# Patient Record
Sex: MALE | Race: WHITE | NOT HISPANIC OR LATINO | Employment: STUDENT | URBAN - METROPOLITAN AREA
[De-identification: names, ages, dates, MRNs, and addresses within clinical notes are randomized per-mention and may not be internally consistent; named-entity substitution may affect disease eponyms.]

---
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
## 2024-08-09 ENCOUNTER — NURSE TRIAGE (OUTPATIENT)
Dept: PEDIATRICS | Facility: CLINIC | Age: 19
End: 2024-08-09
Payer: COMMERCIAL

## 2024-08-09 ENCOUNTER — OFFICE VISIT (OUTPATIENT)
Dept: URGENT CARE | Facility: URGENT CARE | Age: 19
End: 2024-08-09
Payer: COMMERCIAL

## 2024-08-09 VITALS
DIASTOLIC BLOOD PRESSURE: 72 MMHG | WEIGHT: 201.9 LBS | SYSTOLIC BLOOD PRESSURE: 113 MMHG | TEMPERATURE: 97.5 F | OXYGEN SATURATION: 99 % | HEART RATE: 60 BPM | BODY MASS INDEX: 28.56 KG/M2

## 2024-08-09 DIAGNOSIS — D69.6 THROMBOCYTOPENIA (H): ICD-10-CM

## 2024-08-09 DIAGNOSIS — D72.819 LEUKOPENIA, UNSPECIFIED TYPE: ICD-10-CM

## 2024-08-09 DIAGNOSIS — R21 RASH AND NONSPECIFIC SKIN ERUPTION: Primary | ICD-10-CM

## 2024-08-09 LAB
ANION GAP SERPL CALCULATED.3IONS-SCNC: 3 MMOL/L (ref 3–14)
BUN SERPL-MCNC: 10 MG/DL (ref 7–30)
CALCIUM SERPL-MCNC: 9.3 MG/DL (ref 8.5–10.1)
CHLORIDE BLD-SCNC: 105 MMOL/L (ref 94–109)
CO2 SERPL-SCNC: 33 MMOL/L (ref 20–32)
CREAT SERPL-MCNC: 1.2 MG/DL (ref 0.66–1.25)
DEPRECATED S PYO AG THROAT QL EIA: NEGATIVE
EGFRCR SERPLBLD CKD-EPI 2021: 89 ML/MIN/1.73M2
ERYTHROCYTE [SEDIMENTATION RATE] IN BLOOD BY WESTERGREN METHOD: 7 MM/HR (ref 0–15)
FLUAV AG SPEC QL IA: NEGATIVE
FLUBV AG SPEC QL IA: NEGATIVE
GLUCOSE BLD-MCNC: 97 MG/DL (ref 70–99)
MONOCYTES NFR BLD AUTO: NEGATIVE %
POTASSIUM BLD-SCNC: 4.5 MMOL/L (ref 3.4–5.3)
SODIUM SERPL-SCNC: 141 MMOL/L (ref 135–145)

## 2024-08-09 PROCEDURE — 85027 COMPLETE CBC AUTOMATED: CPT | Performed by: PHYSICIAN ASSISTANT

## 2024-08-09 PROCEDURE — 86140 C-REACTIVE PROTEIN: CPT | Performed by: PHYSICIAN ASSISTANT

## 2024-08-09 PROCEDURE — 99214 OFFICE O/P EST MOD 30 MIN: CPT | Performed by: PHYSICIAN ASSISTANT

## 2024-08-09 PROCEDURE — 86308 HETEROPHILE ANTIBODY SCREEN: CPT | Performed by: PHYSICIAN ASSISTANT

## 2024-08-09 PROCEDURE — 87804 INFLUENZA ASSAY W/OPTIC: CPT | Performed by: PHYSICIAN ASSISTANT

## 2024-08-09 PROCEDURE — 80048 BASIC METABOLIC PNL TOTAL CA: CPT | Performed by: PHYSICIAN ASSISTANT

## 2024-08-09 PROCEDURE — 87651 STREP A DNA AMP PROBE: CPT | Performed by: PHYSICIAN ASSISTANT

## 2024-08-09 PROCEDURE — 36415 COLL VENOUS BLD VENIPUNCTURE: CPT | Performed by: PHYSICIAN ASSISTANT

## 2024-08-09 PROCEDURE — 85652 RBC SED RATE AUTOMATED: CPT | Performed by: PHYSICIAN ASSISTANT

## 2024-08-09 NOTE — PROGRESS NOTES
Assessment & Plan     1. Rash and nonspecific skin eruption  - CBC with platelets; Future  - Basic metabolic panel  (Ca, Cl, CO2, Creat, Gluc, K, Na, BUN); Future  - ESR: Erythrocyte sedimentation rate; Future  - CRP, inflammation; Future  - Streptococcus A Rapid Screen w/Reflex to PCR - Clinic Collect  - Influenza A/B antigen  - Mononucleosis screen; Future  - CBC with platelets  - Basic metabolic panel  (Ca, Cl, CO2, Creat, Gluc, K, Na, BUN)  - ESR: Erythrocyte sedimentation rate  - CRP, inflammation  - Mononucleosis screen  - Group A Streptococcus PCR Throat Swab    2. Thrombocytopenia    3. Leukopenia, unspecified type      Patient presents to the clinic for evaluation of rash for the past 2 days. Initially, he had a fever, but that has since resolved. Rash is maculopapular. Non tender and not weeping.   She does not he does not have laboratory evidence of inflammation as his sed rate is normal.  Strep test, influenza test and monotest are all negative.  Strep test and influenza test is negative. I suspect that this is a viral exanthem, as he also has mild thrombocytopenia, and leukopenia.  His CBC is currently under slide review. Very low suspicion for sepsis. Rash is not fungal appearing, does not appear to be hives. He does not have angioedema or severe systemic reaction. At this time, I advised him to monitor his symptoms. Trial of zyrtec for the rash. If worsening, or if fever returns, severe symptoms, chest pain, SOB, weakness, neck pain, headache, weakness etc to follow-up urgently. I would like him to have his CBC redrawn in one week to ensure thrombocytopenia and leukopenia resolved.    Return in about 2 days (around 8/11/2024), or if symptoms worsen or fail to improve.    Diagnosis and treatment plan was reviewed with patient and/or family.   We went over any labs or imaging. Discussed worsening symptoms or little to no relief despite treatment plan to follow-up with PCP or return to clinic.  Patient  verbalizes understanding. All questions were addressed and answered.     Cecilia Queen PA-C  [facility]    CHIEF COMPLAINT:   Chief Complaint   Patient presents with    Urgent Care     Has been having a fever for the past 4 days, started Sunday night/Monday morning. Developed rash on forehead, face, chest, and upper back. No pain with the rash. Fever is gone as of this morning.      Kavya Lala is a 19 year old male who presents to clinic today for evaluation of rash. Started yesterday and located on his face, forehead, chest and back.   Initially, he had a fever and dry cough for 4 days, that resolved this AM.    Rash is not itchy or painful. No weeping or drainage from the wound.   He was sweating in his sleep when he had the fever and is curious if the rash could be from that.   No history of rash like this in the past.   No new products or detergents. No new foods or medications.   Slight non productive cough. NO chest pain or shortness of breath.   He has not had tongue / lip swelling, difficulty breathing.       Neg COVID test at home.       Past Medical History:   Diagnosis Date    Closed nondisplaced fracture of triquetrum of left wrist 12/1/2015     No past surgical history on file.  Social History     Tobacco Use    Smoking status: Never     Passive exposure: Never    Smokeless tobacco: Never    Tobacco comments:     nonsmoking home   Substance Use Topics    Alcohol use: Never     Alcohol/week: 0.0 standard drinks of alcohol     Current Outpatient Medications   Medication Sig Dispense Refill    acetaminophen (TYLENOL) 500 MG tablet Take 500-1,000 mg by mouth every 6 hours as needed      guanFACINE (INTUNIV) 2 MG TB24 24 hr tablet Take 1 tablet (2 mg) by mouth At Bedtime 90 tablet 3    guanFACINE (INTUNIV) 2 MG TB24 24 hr tablet Take 1 tablet (2 mg) by mouth At Bedtime 90 tablet 0     No current facility-administered medications for this visit.     Allergies   Allergen  Reactions    Lactose Intolerance (Gi) Diarrhea    Seasonal Allergies        10 point ROS of systems were all negative except for pertinent positives noted in my HPI.      Exam:   /72 (BP Location: Right arm)   Pulse 60   Temp 97.5  F (36.4  C) (Tympanic)   Wt 91.6 kg (201 lb 14.4 oz)   SpO2 99%   BMI 28.56 kg/m    Constitutional: healthy, alert and no distress  Head: Normocephalic, atraumatic.  Eyes: conjunctiva clear, no drainage  ENT: TMs clear and shiny alfred, nasal mucosa pink and moist, throat without tonsillar hypertrophy or erythema  Neck: neck is supple, no cervical lymphadenopathy or nuchal rigidity  Cardiovascular: RRR  Respiratory: CTA bilaterally, no rhonchi or rales  Gastrointestinal: soft and nontender  Skin: Erythematous maculopapular type rash on the forehead, back and chest. No vesicles, crusitng or weeping.   Neurologic: Speech clear, gait normal. Moves all extremities.                Results for orders placed or performed in visit on 08/09/24   Basic metabolic panel  (Ca, Cl, CO2, Creat, Gluc, K, Na, BUN)     Status: Abnormal   Result Value Ref Range    Sodium 141 135 - 145 mmol/L    Potassium 4.5 3.4 - 5.3 mmol/L    Chloride 105 94 - 109 mmol/L    Carbon Dioxide (CO2) 33 (H) 20 - 32 mmol/L    Anion Gap 3 3 - 14 mmol/L    Urea Nitrogen 10 7 - 30 mg/dL    Creatinine 1.20 0.66 - 1.25 mg/dL    GFR Estimate 89 >60 mL/min/1.73m2    Calcium 9.3 8.5 - 10.1 mg/dL    Glucose 97 70 - 99 mg/dL   ESR: Erythrocyte sedimentation rate     Status: Normal   Result Value Ref Range    Erythrocyte Sedimentation Rate 7 0 - 15 mm/hr   Mononucleosis screen     Status: Normal   Result Value Ref Range    Mononucleosis Screen Negative Negative   Streptococcus A Rapid Screen w/Reflex to PCR - Clinic Collect     Status: Normal    Specimen: Throat; Swab   Result Value Ref Range    Group A Strep antigen Negative Negative   Influenza A/B antigen     Status: Normal    Specimen: Nose; Swab   Result Value Ref Range     Influenza A antigen Negative Negative    Influenza B antigen Negative Negative    Narrative    Test results must be correlated with clinical data. If necessary, results should be confirmed by a molecular assay or viral culture.     CBC  WBC 2.25  HGB 14

## 2024-08-09 NOTE — TELEPHONE ENCOUNTER
Pt called worried about rash that started on 8/8 AM. States rash is red, splotchy, and rough and is located on forehead, face, chest and back. Pt also states that he has not been feeling well lately with a fever that started on 8/5 (103 F) until 8/9 1am (current temp is 98 F) and has a lingering cough. Has been waking up the past two nights covered in sweat. States that the rash on his back and chest did not appear until he slept shirtless on 8/8. He believes the rash might be from night sweats and the fever breaking.     Denies itching, SOB, swelling, hives, and starting any new medications in the past 2 weeks. Took 2 at home COVID tests that came back negative.      Per protocol, advised pt to be seen in  today since no appts in office available. Patient was given an opportunity to ask questions and at this time denies further questions. Patient verbalizes understanding, and is agreeable to the discussed plan. Advised patient if there are any further questions, to reach out for assistance. Patient agrees to follow-up as needed.     Carmelita Robert RN on 8/9/2024 at 3:36 PM    08/09/2024 at 3:36 PM     Reason for Disposition   Mild widespread rash  (Exception: Heat rash lasting 3 days or less.)    Additional Information   Negative: Sudden onset of rash (within last 2 hours) and difficulty with breathing or swallowing   Negative: Difficult to awaken or acting confused (e.g., disoriented, slurred speech)   Negative: Fever and purple or blood-colored spots or dots   Negative: Too weak or sick to stand   Negative: Life-threatening reaction (anaphylaxis) in the past to similar substance (e.g., food, insect bite/sting, chemical, etc.) and < 2 hours since exposure   Negative: Sounds like a life-threatening emergency to the triager   Negative: Drug rash suspected and started taking new medicine within last 2 weeks  (Exception: Antihistamine, eye drops, ear drops, decongestant or other OTC cough/cold medicines.)    "Negative: Hives suspected   Negative: Insect bites suspected   Negative: MPOX SUSPECTED (e.g., direct skin contact such as sex, recent travel to [place] or [place]) and any SYMPTOMS OF MPOX (e.g., rash, fever, muscle aches, or swollen lymph nodes)   Negative: At risk for Mpox (men-who-have-sex-with-men) and possible exposure (e.g., multiple sex partners in past 21 days) and ANY SYMPTOMS OF MPOX (e.g., rash, fever, muscle aches, or swollen lymph nodes)   Negative: Sunburn suspected   Negative: Bright red, sunburn-like rash and current tampon use or nasal packing   Negative: Bright red, sunburn-like rash and wound infection or recent surgery   Negative: Bright red skin that peels off in sheets   Negative: Stiff neck (can't touch chin to chest)   Negative: Patient sounds very sick or weak to the triager   Negative: Fever   Negative: Face becomes swollen   Negative: Headache   Negative: Purple or blood-colored spots or dots (no fever and sounds well to triager)   Negative: Joint pain or swelling   Negative: Bloody crusts on lips or sores in mouth   Negative: Large or small blisters on skin (i.e., fluid filled bubbles or sacs)   Negative: Pregnant   Negative: Rash began within 4 hours of a new prescription medication   Negative: SEVERE itching   Negative: Sore throat   Negative: Ring-like appearance of rash (or ask: does it look like a 'target' or 'bulls-eye')   Negative: Patient wants to be seen    Answer Assessment - Initial Assessment Questions  1. APPEARANCE of RASH: \"Describe the rash.\" (e.g., spots, blisters, raised areas, skin peeling, scaly)      Spots, patches, rough   2. SIZE: \"How big are the spots?\" (e.g., tip of pen, eraser, coin; inches, centimeters)      Splotchy   3. LOCATION: \"Where is the rash located?\"      Forehead, face, back,   4. COLOR: \"What color is the rash?\" (Note: It is difficult to assess rash color in people with darker-colored skin. When this situation occurs, simply ask the caller to " "describe what they see.)      red  5. ONSET: \"When did the rash begin?\"      8/8  6. FEVER: \"Do you have a fever?\" If Yes, ask: \"What is your temperature, how was it measured, and when did it start?\"      Not currently. Last time had fever 8/9 1am  7. ITCHING: \"Does the rash itch?\" If Yes, ask: \"How bad is the itch?\" (Scale 1-10; or mild, moderate, severe)      No  8. CAUSE: \"What do you think is causing the rash?\"      Sweating in bed, fever breaking   9. MEDICINE FACTORS: \"Have you started any new medicines within the last 2 weeks?\" (e.g., antibiotics)       No  10. OTHER SYMPTOMS: \"Do you have any other symptoms?\" (e.g., dizziness, headache, sore throat, joint pain)        Lingering cough    Protocols used: Rash or Redness - Widespread-A-OH    "

## 2024-08-09 NOTE — PATIENT INSTRUCTIONS
I suspect that you have a viral illness  Your labs came back with a low WBC and platelet count, this can happen with viral infections as well. The lab is being rechecked for slide review.   I would like you to have your CBC rechecked in one week  In regards to the rash, take 10mg of zyrtec daily  Monitor  Avoid sunlight, heat     If worsening, please follow-up right away, or if fever returns, you have drainage from the rash, weakness, severe headache or neck pain etc

## 2024-08-10 LAB
CRP SERPL-MCNC: 3.51 MG/L
ERYTHROCYTE [DISTWIDTH] IN BLOOD BY AUTOMATED COUNT: 11.7 % (ref 10–15)
GROUP A STREP BY PCR: NOT DETECTED
HCT VFR BLD AUTO: 41.3 % (ref 40–53)
HGB BLD-MCNC: 14.1 G/DL (ref 13.3–17.7)
MCH RBC QN AUTO: 29.6 PG (ref 26.5–33)
MCHC RBC AUTO-ENTMCNC: 34.1 G/DL (ref 31.5–36.5)
MCV RBC AUTO: 87 FL (ref 78–100)
PLATELET # BLD AUTO: 111 10E3/UL (ref 150–450)
RBC # BLD AUTO: 4.77 10E6/UL (ref 4.4–5.9)
WBC # BLD AUTO: 2.2 10E3/UL (ref 4–11)

## 2024-09-01 ENCOUNTER — HEALTH MAINTENANCE LETTER (OUTPATIENT)
Age: 19
End: 2024-09-01

## 2024-10-13 DIAGNOSIS — F98.8 ATTENTION DEFICIT DISORDER (ADD) WITHOUT HYPERACTIVITY: ICD-10-CM

## 2024-10-13 RX ORDER — GUANFACINE 2 MG/1
2 TABLET, EXTENDED RELEASE ORAL AT BEDTIME
Qty: 90 TABLET | Refills: 1 | OUTPATIENT
Start: 2024-10-13

## 2024-10-13 RX ORDER — GUANFACINE 2 MG/1
2 TABLET, EXTENDED RELEASE ORAL AT BEDTIME
Qty: 90 TABLET | Refills: 0 | Status: SHIPPED | OUTPATIENT
Start: 2024-10-13

## 2025-01-25 DIAGNOSIS — F98.8 ATTENTION DEFICIT DISORDER (ADD) WITHOUT HYPERACTIVITY: ICD-10-CM

## 2025-01-25 RX ORDER — GUANFACINE 2 MG/1
2 TABLET, EXTENDED RELEASE ORAL AT BEDTIME
Qty: 90 TABLET | Refills: 3 | Status: SHIPPED | OUTPATIENT
Start: 2025-01-25

## 2025-03-12 ENCOUNTER — TRANSFERRED RECORDS (OUTPATIENT)
Dept: HEALTH INFORMATION MANAGEMENT | Facility: CLINIC | Age: 20
End: 2025-03-12
Payer: COMMERCIAL

## 2025-03-25 PROBLEM — Z87.09 HISTORY OF PNEUMOTHORAX: Status: ACTIVE | Noted: 2025-03-25

## 2025-07-15 ENCOUNTER — VIRTUAL VISIT (OUTPATIENT)
Dept: PEDIATRICS | Facility: CLINIC | Age: 20
End: 2025-07-15
Payer: COMMERCIAL

## 2025-07-15 DIAGNOSIS — F98.8 ATTENTION DEFICIT DISORDER (ADD) WITHOUT HYPERACTIVITY: Primary | ICD-10-CM

## 2025-07-15 PROCEDURE — 98006 SYNCH AUDIO-VIDEO EST MOD 30: CPT | Performed by: INTERNAL MEDICINE

## 2025-07-15 RX ORDER — METHYLPHENIDATE HYDROCHLORIDE 27 MG/1
27 TABLET ORAL EVERY MORNING
Qty: 30 TABLET | Refills: 0 | Status: SHIPPED | OUTPATIENT
Start: 2025-07-15

## 2025-07-15 RX ORDER — GUANFACINE 2 MG/1
2 TABLET, EXTENDED RELEASE ORAL AT BEDTIME
Qty: 90 TABLET | Refills: 3 | Status: SHIPPED | OUTPATIENT
Start: 2025-07-15

## 2025-07-15 NOTE — PROGRESS NOTES
"Spike is a 20 year old who is being evaluated via a billable video visit.    How would you like to obtain your AVS? [employer]  If the video visit is dropped, the invitation should be resent by: Send to e-mail at: [email or web address]  Will anyone else be joining your video visit? No      Assessment & Plan     Attention deficit disorder (ADD) without hyperactivity  Uncontrolled.  Discussed medication options including strattera and stimulants including side effects and benefits.  Elected to try concerta per orders.  Follow-up via [employer] to adjust dose and in-person at winter break  - methylphenidate HCL ER, OSM, (CONCERTA) 27 MG CR tablet; Take 1 tablet (27 mg) by mouth every morning.  - guanFACINE (INTUNIV) 2 MG TB24 24 hr tablet; Take 1 tablet (2 mg) by mouth at bedtime.    BMI  Estimated body mass index is 29.02 kg/m  as calculated from the following:    Height as of 11/29/24: 1.803 m (5' 11\").    Weight as of 11/29/24: 94.4 kg (208 lb 1.6 oz).           Subjective   Spike is a 20 year old, presenting for the following health issues:  A.D.H.D      7/15/2025     1:53 PM   Additional Questions   Roomed by Sylwia Lazar CMA     A.D.H.DOC    History of Present Illness       Reason for visit:  Discussing ADD medication    He eats 2-3 servings of fruits and vegetables daily.He consumes 2 sweetened beverage(s) daily.He exercises with enough effort to increase his heart rate 9 or less minutes per day.  He exercises with enough effort to increase his heart rate 3 or less days per week.   He is taking medications regularly.      Student at [employer].  Just finished andie year.  Working at [employer] this summer.  Majoring in [employer] science.      Has had ADD for years.  GF pointed out things about how he struggles to plan ahead, procrastinates etc.  Impacts homework, applying for jobs, stuff around the house.  Has only ever been on intuniv.            Objective           Vitals:  No vitals were obtained today due to virtual " visit.    Physical Exam   GENERAL: alert and no distress  EYES: Eyes grossly normal to inspection.  No discharge or erythema, or obvious scleral/conjunctival abnormalities.  RESP: No audible wheeze, cough, or visible cyanosis.    SKIN: Visible skin clear. No significant rash, abnormal pigmentation or lesions.  NEURO: Cranial nerves grossly intact.  Mentation and speech appropriate for age.  PSYCH: Appropriate affect, tone, and pace of words          Video-Visit Details    Type of service:  Video Visit   Originating Location (pt. Location): Home    Distant Location (provider location):  On-site  Platform used for Video Visit: Horace  Signed Electronically by: Apryl Langford MD

## 2025-07-15 NOTE — PATIENT INSTRUCTIONS
Start the concerta at 27mg daily.  Let me know if you have side effects.  Let me know how it is working and if you want to increase the dose.